# Patient Record
Sex: FEMALE | Race: WHITE | Employment: UNEMPLOYED | URBAN - NONMETROPOLITAN AREA
[De-identification: names, ages, dates, MRNs, and addresses within clinical notes are randomized per-mention and may not be internally consistent; named-entity substitution may affect disease eponyms.]

---

## 2018-05-08 ENCOUNTER — HOSPITAL ENCOUNTER (EMERGENCY)
Age: 28
Discharge: HOME | End: 2018-05-08

## 2018-05-08 VITALS
WEIGHT: 130 LBS | HEART RATE: 83 BPM | HEIGHT: 67 IN | DIASTOLIC BLOOD PRESSURE: 81 MMHG | BODY MASS INDEX: 20.4 KG/M2 | OXYGEN SATURATION: 97 % | RESPIRATION RATE: 13 BRPM | TEMPERATURE: 97.9 F | SYSTOLIC BLOOD PRESSURE: 114 MMHG

## 2018-05-08 DIAGNOSIS — K02.9 PAIN DUE TO DENTAL CARIES: Primary | ICD-10-CM

## 2018-05-08 LAB
AMPHETAMINE+METHAMPHETAMINE URINE SCREEN: POSITIVE
BACTERIA: ABNORMAL /HPF
BARBITURATE QUANTITATIVE URINE: NEGATIVE
BENZODIAZEPINE QUANTITATIVE URINE: NEGATIVE
BILIRUBIN URINE: ABNORMAL
BLOOD, URINE: ABNORMAL
CANNABINOID QUANTITATIVE URINE: POSITIVE
CASTS 2: ABNORMAL /LPF
CASTS UA: ABNORMAL /LPF
CHARACTER, URINE: ABNORMAL
COCAINE METABOLITE QUANTITATIVE URINE: NEGATIVE
COLOR: YELLOW
CRYSTALS, UA: ABNORMAL
EPITHELIAL CELLS, UA: ABNORMAL /HPF
GLUCOSE URINE: NEGATIVE MG/DL
ICTOTEST: NEGATIVE
KETONES, URINE: NEGATIVE
LEUKOCYTE ESTERASE, URINE: ABNORMAL
MISCELLANEOUS 2: ABNORMAL
MUCUS: ABNORMAL
NITRITE, URINE: NEGATIVE
OPIATES, URINE: NEGATIVE
OXYCODONE: NEGATIVE
PH UA: 6
PHENCYCLIDINE QUANTITATIVE URINE: NEGATIVE
PROTEIN UA: 30
RBC URINE: ABNORMAL /HPF
RENAL EPITHELIAL, UA: ABNORMAL
SPECIFIC GRAVITY, URINE: 1.03 (ref 1–1.03)
UROBILINOGEN, URINE: 0.2 EU/DL
WBC UA: > 100 /HPF
YEAST: ABNORMAL

## 2018-05-08 PROCEDURE — 87086 URINE CULTURE/COLONY COUNT: CPT

## 2018-05-08 PROCEDURE — 6370000000 HC RX 637 (ALT 250 FOR IP): Performed by: NURSE PRACTITIONER

## 2018-05-08 PROCEDURE — 99283 EMERGENCY DEPT VISIT LOW MDM: CPT

## 2018-05-08 PROCEDURE — 81001 URINALYSIS AUTO W/SCOPE: CPT

## 2018-05-08 PROCEDURE — 80307 DRUG TEST PRSMV CHEM ANLYZR: CPT

## 2018-05-08 RX ORDER — PENICILLIN V POTASSIUM 500 MG/1
500 TABLET ORAL 4 TIMES DAILY
Qty: 28 TABLET | Refills: 0 | Status: SHIPPED | OUTPATIENT
Start: 2018-05-08 | End: 2018-05-15

## 2018-05-08 RX ADMIN — Medication 15 ML: at 02:40

## 2018-05-08 ASSESSMENT — PAIN SCALES - GENERAL
PAINLEVEL_OUTOF10: 10
PAINLEVEL_OUTOF10: 10

## 2018-05-08 ASSESSMENT — PAIN DESCRIPTION - DESCRIPTORS: DESCRIPTORS: SHOOTING;SHARP

## 2018-05-08 ASSESSMENT — PAIN DESCRIPTION - LOCATION
LOCATION: TEETH
LOCATION: TEETH

## 2018-05-08 ASSESSMENT — PAIN DESCRIPTION - ORIENTATION
ORIENTATION: LEFT
ORIENTATION: LEFT

## 2018-05-08 ASSESSMENT — PAIN DESCRIPTION - PAIN TYPE: TYPE: ACUTE PAIN

## 2018-05-09 LAB
ORGANISM: ABNORMAL
URINE CULTURE REFLEX: ABNORMAL

## 2018-05-16 ASSESSMENT — ENCOUNTER SYMPTOMS
COUGH: 0
BACK PAIN: 0
CHEST TIGHTNESS: 0
RHINORRHEA: 0

## 2019-03-05 ENCOUNTER — HOSPITAL ENCOUNTER (EMERGENCY)
Age: 29
Discharge: HOME | End: 2019-03-05

## 2019-03-05 ENCOUNTER — APPOINTMENT (OUTPATIENT)
Dept: CT IMAGING | Age: 29
End: 2019-03-05

## 2019-03-05 VITALS
WEIGHT: 120 LBS | TEMPERATURE: 97.9 F | OXYGEN SATURATION: 100 % | BODY MASS INDEX: 18.83 KG/M2 | SYSTOLIC BLOOD PRESSURE: 128 MMHG | HEART RATE: 55 BPM | HEIGHT: 67 IN | RESPIRATION RATE: 12 BRPM | DIASTOLIC BLOOD PRESSURE: 82 MMHG

## 2019-03-05 DIAGNOSIS — K02.9 DENTAL DECAY: ICD-10-CM

## 2019-03-05 DIAGNOSIS — F41.0 ANXIETY ATTACK: Primary | ICD-10-CM

## 2019-03-05 LAB
ALBUMIN SERPL-MCNC: 4.3 G/DL (ref 3.5–5.1)
ALP BLD-CCNC: 43 U/L (ref 38–126)
ALT SERPL-CCNC: 10 U/L (ref 11–66)
AMPHETAMINE+METHAMPHETAMINE URINE SCREEN: POSITIVE
ANION GAP SERPL CALCULATED.3IONS-SCNC: 15 MEQ/L (ref 8–16)
AST SERPL-CCNC: 16 U/L (ref 5–40)
BACTERIA: ABNORMAL /HPF
BARBITURATE QUANTITATIVE URINE: NEGATIVE
BASOPHILS # BLD: 0.7 %
BASOPHILS ABSOLUTE: 0.1 THOU/MM3 (ref 0–0.1)
BENZODIAZEPINE QUANTITATIVE URINE: NEGATIVE
BILIRUB SERPL-MCNC: 0.3 MG/DL (ref 0.3–1.2)
BILIRUBIN URINE: NEGATIVE
BLOOD, URINE: NEGATIVE
BUN BLDV-MCNC: 10 MG/DL (ref 7–22)
CALCIUM SERPL-MCNC: 9.2 MG/DL (ref 8.5–10.5)
CANNABINOID QUANTITATIVE URINE: POSITIVE
CASTS 2: ABNORMAL /LPF
CASTS UA: ABNORMAL /LPF
CHARACTER, URINE: CLEAR
CHLORIDE BLD-SCNC: 106 MEQ/L (ref 98–111)
CO2: 21 MEQ/L (ref 23–33)
COCAINE METABOLITE QUANTITATIVE URINE: NEGATIVE
COLOR: YELLOW
CREAT SERPL-MCNC: 0.7 MG/DL (ref 0.4–1.2)
CRYSTALS, UA: ABNORMAL
EKG ATRIAL RATE: 95 BPM
EKG P AXIS: 76 DEGREES
EKG P-R INTERVAL: 110 MS
EKG Q-T INTERVAL: 390 MS
EKG QRS DURATION: 86 MS
EKG QTC CALCULATION (BAZETT): 490 MS
EKG R AXIS: 76 DEGREES
EKG T AXIS: 57 DEGREES
EKG VENTRICULAR RATE: 95 BPM
EOSINOPHIL # BLD: 1.8 %
EOSINOPHILS ABSOLUTE: 0.1 THOU/MM3 (ref 0–0.4)
EPITHELIAL CELLS, UA: ABNORMAL /HPF
ERYTHROCYTE [DISTWIDTH] IN BLOOD BY AUTOMATED COUNT: 12.1 % (ref 11.5–14.5)
ERYTHROCYTE [DISTWIDTH] IN BLOOD BY AUTOMATED COUNT: 40.6 FL (ref 35–45)
FLU A ANTIGEN: NEGATIVE
FLU B ANTIGEN: NEGATIVE
GFR SERPL CREATININE-BSD FRML MDRD: > 90 ML/MIN/1.73M2
GLUCOSE BLD-MCNC: 93 MG/DL (ref 70–108)
GLUCOSE URINE: NEGATIVE MG/DL
HCT VFR BLD CALC: 36.2 % (ref 37–47)
HEMOGLOBIN: 12.2 GM/DL (ref 12–16)
IMMATURE GRANS (ABS): 0.01 THOU/MM3 (ref 0–0.07)
IMMATURE GRANULOCYTES: 0.1 %
KETONES, URINE: 15
LEUKOCYTE ESTERASE, URINE: ABNORMAL
LIPASE: 15.3 U/L (ref 5.6–51.3)
LYMPHOCYTES # BLD: 33.8 %
LYMPHOCYTES ABSOLUTE: 2.5 THOU/MM3 (ref 1–4.8)
MCH RBC QN AUTO: 30.9 PG (ref 26–33)
MCHC RBC AUTO-ENTMCNC: 33.7 GM/DL (ref 32.2–35.5)
MCV RBC AUTO: 91.6 FL (ref 81–99)
MISCELLANEOUS 2: ABNORMAL
MONOCYTES # BLD: 7.3 %
MONOCYTES ABSOLUTE: 0.5 THOU/MM3 (ref 0.4–1.3)
NITRITE, URINE: NEGATIVE
NUCLEATED RED BLOOD CELLS: 0 /100 WBC
OPIATES, URINE: NEGATIVE
OSMOLALITY CALCULATION: 281.9 MOSMOL/KG (ref 275–300)
OXYCODONE: NEGATIVE
PH UA: 7.5 (ref 5–9)
PHENCYCLIDINE QUANTITATIVE URINE: NEGATIVE
PLATELET # BLD: 293 THOU/MM3 (ref 130–400)
PMV BLD AUTO: 9.8 FL (ref 9.4–12.4)
POTASSIUM SERPL-SCNC: 3.6 MEQ/L (ref 3.5–5.2)
PREGNANCY, SERUM: NEGATIVE
PROTEIN UA: NEGATIVE
RBC # BLD: 3.95 MILL/MM3 (ref 4.2–5.4)
RBC URINE: ABNORMAL /HPF
RENAL EPITHELIAL, UA: ABNORMAL
SEG NEUTROPHILS: 56.3 %
SEGMENTED NEUTROPHILS ABSOLUTE COUNT: 4.1 THOU/MM3 (ref 1.8–7.7)
SODIUM BLD-SCNC: 142 MEQ/L (ref 135–145)
SPECIFIC GRAVITY, URINE: 1.01 (ref 1–1.03)
TOTAL PROTEIN: 6.9 G/DL (ref 6.1–8)
UROBILINOGEN, URINE: 0.2 EU/DL (ref 0–1)
WBC # BLD: 7.3 THOU/MM3 (ref 4.8–10.8)
WBC UA: ABNORMAL /HPF
YEAST: ABNORMAL

## 2019-03-05 PROCEDURE — 6360000004 HC RX CONTRAST MEDICATION: Performed by: STUDENT IN AN ORGANIZED HEALTH CARE EDUCATION/TRAINING PROGRAM

## 2019-03-05 PROCEDURE — 85025 COMPLETE CBC W/AUTO DIFF WBC: CPT

## 2019-03-05 PROCEDURE — 2709999900 HC NON-CHARGEABLE SUPPLY

## 2019-03-05 PROCEDURE — 87106 FUNGI IDENTIFICATION YEAST: CPT

## 2019-03-05 PROCEDURE — 70491 CT SOFT TISSUE NECK W/DYE: CPT

## 2019-03-05 PROCEDURE — 93010 ELECTROCARDIOGRAM REPORT: CPT | Performed by: INTERNAL MEDICINE

## 2019-03-05 PROCEDURE — 83690 ASSAY OF LIPASE: CPT

## 2019-03-05 PROCEDURE — 36415 COLL VENOUS BLD VENIPUNCTURE: CPT

## 2019-03-05 PROCEDURE — 93005 ELECTROCARDIOGRAM TRACING: CPT | Performed by: STUDENT IN AN ORGANIZED HEALTH CARE EDUCATION/TRAINING PROGRAM

## 2019-03-05 PROCEDURE — 87804 INFLUENZA ASSAY W/OPTIC: CPT

## 2019-03-05 PROCEDURE — 6370000000 HC RX 637 (ALT 250 FOR IP): Performed by: STUDENT IN AN ORGANIZED HEALTH CARE EDUCATION/TRAINING PROGRAM

## 2019-03-05 PROCEDURE — 99285 EMERGENCY DEPT VISIT HI MDM: CPT

## 2019-03-05 PROCEDURE — 80307 DRUG TEST PRSMV CHEM ANLYZR: CPT

## 2019-03-05 PROCEDURE — 80053 COMPREHEN METABOLIC PANEL: CPT

## 2019-03-05 PROCEDURE — 84703 CHORIONIC GONADOTROPIN ASSAY: CPT

## 2019-03-05 PROCEDURE — 87086 URINE CULTURE/COLONY COUNT: CPT

## 2019-03-05 PROCEDURE — 81001 URINALYSIS AUTO W/SCOPE: CPT

## 2019-03-05 RX ORDER — PENICILLIN V POTASSIUM 500 MG/1
500 TABLET ORAL 4 TIMES DAILY
Qty: 28 TABLET | Refills: 0 | Status: SHIPPED | OUTPATIENT
Start: 2019-03-05 | End: 2019-03-12

## 2019-03-05 RX ORDER — ALPRAZOLAM 0.5 MG/1
0.5 TABLET ORAL ONCE
Status: COMPLETED | OUTPATIENT
Start: 2019-03-05 | End: 2019-03-05

## 2019-03-05 RX ADMIN — LIDOCAINE HYDROCHLORIDE 5 ML: 20 SOLUTION ORAL; TOPICAL at 10:12

## 2019-03-05 RX ADMIN — ALPRAZOLAM 0.5 MG: 0.5 TABLET ORAL at 10:11

## 2019-03-05 RX ADMIN — IOPAMIDOL 80 ML: 755 INJECTION, SOLUTION INTRAVENOUS at 12:43

## 2019-03-05 ASSESSMENT — ENCOUNTER SYMPTOMS
EYE PAIN: 0
BACK PAIN: 0
BLOOD IN STOOL: 0
CONSTIPATION: 0
SHORTNESS OF BREATH: 1
VOMITING: 0
CHEST TIGHTNESS: 0
NAUSEA: 1
COUGH: 0
PHOTOPHOBIA: 0
WHEEZING: 0
DIARRHEA: 0
SORE THROAT: 0
RHINORRHEA: 0
ABDOMINAL PAIN: 1

## 2019-03-10 LAB
ORGANISM: ABNORMAL
URINE CULTURE REFLEX: ABNORMAL
URINE CULTURE REFLEX: ABNORMAL

## 2019-09-13 ENCOUNTER — HOSPITAL ENCOUNTER (OUTPATIENT)
Age: 29
Setting detail: SPECIMEN
Discharge: HOME | End: 2019-09-13

## 2019-09-15 LAB
CULTURE: ABNORMAL
Lab: ABNORMAL
SPECIMEN DESCRIPTION: ABNORMAL

## 2020-01-25 ENCOUNTER — HOSPITAL ENCOUNTER (EMERGENCY)
Age: 30
Discharge: HOME | End: 2020-01-25
Attending: FAMILY MEDICINE

## 2020-01-25 VITALS
TEMPERATURE: 97.6 F | SYSTOLIC BLOOD PRESSURE: 131 MMHG | OXYGEN SATURATION: 100 % | RESPIRATION RATE: 16 BRPM | DIASTOLIC BLOOD PRESSURE: 93 MMHG | HEART RATE: 97 BPM

## 2020-01-25 LAB
AMPHETAMINE+METHAMPHETAMINE URINE SCREEN: NORMAL
BARBITURATE QUANTITATIVE URINE: NEGATIVE
BENZODIAZEPINE QUANTITATIVE URINE: NEGATIVE
CANNABINOID QUANTITATIVE URINE: NORMAL
COCAINE METABOLITE QUANTITATIVE URINE: NEGATIVE
OPIATES, URINE: NEGATIVE
OXYCODONE: NEGATIVE
PHENCYCLIDINE QUANTITATIVE URINE: NEGATIVE
PREGNANCY, SERUM: NEGATIVE

## 2020-01-25 PROCEDURE — 6370000000 HC RX 637 (ALT 250 FOR IP): Performed by: FAMILY MEDICINE

## 2020-01-25 PROCEDURE — 36415 COLL VENOUS BLD VENIPUNCTURE: CPT

## 2020-01-25 PROCEDURE — 99283 EMERGENCY DEPT VISIT LOW MDM: CPT

## 2020-01-25 PROCEDURE — 84703 CHORIONIC GONADOTROPIN ASSAY: CPT

## 2020-01-25 PROCEDURE — 80305 DRUG TEST PRSMV DIR OPT OBS: CPT

## 2020-01-25 RX ORDER — AMOXICILLIN AND CLAVULANATE POTASSIUM 250; 125 MG/1; MG/1
1 TABLET, FILM COATED ORAL ONCE
Status: COMPLETED | OUTPATIENT
Start: 2020-01-25 | End: 2020-01-25

## 2020-01-25 RX ORDER — ACETAMINOPHEN 500 MG
1000 TABLET ORAL ONCE
Status: COMPLETED | OUTPATIENT
Start: 2020-01-25 | End: 2020-01-25

## 2020-01-25 RX ORDER — AMOXICILLIN 250 MG/1
250 CAPSULE ORAL ONCE
Status: COMPLETED | OUTPATIENT
Start: 2020-01-25 | End: 2020-01-25

## 2020-01-25 RX ORDER — AMOXICILLIN AND CLAVULANATE POTASSIUM 500; 125 MG/1; MG/1
1 TABLET, FILM COATED ORAL 3 TIMES DAILY
Qty: 30 TABLET | Refills: 0 | Status: SHIPPED | OUTPATIENT
Start: 2020-01-25 | End: 2020-02-04

## 2020-01-25 RX ADMIN — AMOXICILLIN AND CLAVULANATE POTASSIUM 1 TABLET: 250; 125 TABLET, FILM COATED ORAL at 07:49

## 2020-01-25 RX ADMIN — ACETAMINOPHEN 1000 MG: 500 TABLET ORAL at 07:50

## 2020-01-25 RX ADMIN — AMOXICILLIN 250 MG: 250 CAPSULE ORAL at 07:49

## 2020-01-25 ASSESSMENT — ENCOUNTER SYMPTOMS
NAUSEA: 0
ABDOMINAL PAIN: 0
SHORTNESS OF BREATH: 0
EYE DISCHARGE: 0
VOMITING: 0

## 2020-01-25 ASSESSMENT — PAIN DESCRIPTION - ORIENTATION: ORIENTATION: LEFT;UPPER

## 2020-01-25 ASSESSMENT — PAIN DESCRIPTION - PAIN TYPE: TYPE: ACUTE PAIN

## 2020-01-25 ASSESSMENT — PAIN SCALES - GENERAL
PAINLEVEL_OUTOF10: 10
PAINLEVEL_OUTOF10: 10

## 2020-01-25 ASSESSMENT — PAIN DESCRIPTION - LOCATION: LOCATION: TEETH

## 2020-01-25 NOTE — ED PROVIDER NOTES
[facility]  eMERGENCY dEPARTMENT eNCOUnter          CHIEF COMPLAINT       Chief Complaint   Patient presents with    Dental Pain       Nurses Notes reviewed and I agree except as noted in the HPI. HISTORY OF PRESENT ILLNESS    Charlyn Brigido Gottron is a 30 y.o. female who presents to the Emergency Department for the evaluation of dental pain. The patient states she had some fillings fall out about a year ago and did not get them repaired. She states in the last week she has noticed some yellow drainage, chest pain, left ear pain and difficulty swallowing worsening. She states she has tried mouthwash and brushing her teeth without any relief. The patient denies taking any daily medication or drug use. She is a daily half pack per day smoker and states she does not have a PCP. She rates her current pain a 10/10. The patient denies any other symptoms or relevant history at this time. Location/Symptom: dental pain, left ear pain  Timing/Onset: one week   Context/Setting: history of unrepaired dental fillings   Duration: constant   Modifying Factors: no relief with mouthwash or brushing   Severity: 10/10     The HPI was provided by the patient. REVIEW OF SYSTEMS     Review of Systems   Constitutional: Negative for chills and fever. HENT: Positive for dental problem and ear pain. Left maxillary molar fillings were lost a year ago    Started having more dental pain in the last week with drainage    Pain left maxillary teeth radiating up towards the left ear   Eyes: Negative for discharge. Respiratory: Negative for shortness of breath. Cardiovascular: Negative for chest pain. Gastrointestinal: Negative for abdominal pain, nausea and vomiting. Genitourinary:        LMP 2 weeks ago   Musculoskeletal: Negative for joint swelling. Skin: Negative for rash. Neurological: Negative for headaches. Hematological: Negative for adenopathy.    Psychiatric/Behavioral:        Denies drug use       PAST MEDICAL HISTORY    has a past medical history of Anxiety and History of migraine headaches. SURGICAL HISTORY    has no past surgical history on file. CURRENT MEDICATIONS       Previous Medications    No medications on file       ALLERGIES     has No Known Allergies. FAMILY HISTORY     She indicated that the status of her mother is unknown. She indicated that the status of her father is unknown. She indicated that her sister is alive. family history includes Depression in her sister; No Known Problems in her father and mother. SOCIAL HISTORY      reports that she has been smoking cigarettes. She has a 5.00 pack-year smoking history. She has never used smokeless tobacco. She reports that she does not drink alcohol or use drugs. PHYSICAL EXAM     INITIAL VITALS:  oral temperature is 97.6 °F (36.4 °C). Her blood pressure is 131/93 (abnormal) and her pulse is 97. Her respiration is 16 and oxygen saturation is 100%. Physical Exam  Vitals signs and nursing note reviewed. Constitutional:       Comments: GCS 15   HENT:      Head: Normocephalic and atraumatic. Comments: Ear canals clear TMs are normal    Nose is clear    Mouth showed left maxillary molars with large cavities, the mucous membranes appear normal  No discharge is seen    No soft tissue swelling noted  Eyes:      General:         Right eye: No discharge. Left eye: No discharge. Extraocular Movements: Extraocular movements intact. Pupils: Pupils are equal, round, and reactive to light. Neck:      Musculoskeletal: Normal range of motion. Comments: Neck is supple with no adenopathy    No soft tissue swelling  Cardiovascular:      Rate and Rhythm: Normal rate and regular rhythm. Pulmonary:      Effort: Pulmonary effort is normal.      Breath sounds: Normal breath sounds. Musculoskeletal:         General: No swelling. Lymphadenopathy:      Cervical: No cervical adenopathy.    Skin: General: Skin is warm and dry. Neurological:      General: No focal deficit present. Mental Status: She is alert. Psychiatric:         Mood and Affect: Mood normal.         Behavior: Behavior normal.         DIFFERENTIAL DIAGNOSIS:     Left-sided dental pain    Chronic dental caries    We will start some Augmentin give Tylenol for pain    Check pregnancy test    Review of records indicates multiple drug screen is positive for amphetamines plus marijuana in the past    Patient denies drug use but will need to assess her urine drug screen    DIAGNOSTIC RESULTS       RADIOLOGY: non-plain film images(s) such as CT, Ultrasound and MRI are read by the radiologist.    No orders to display        LABS:   Labs Reviewed   HCG, SERUM, QUALITATIVE   RAPID DRUG SCREEN, URINE       EMERGENCY DEPARTMENT COURSE:   Vitals:    Vitals:    01/25/20 0742 01/25/20 0743   BP: (!) 131/93    Pulse: 97    Resp: 16    Temp:  97.6 °F (36.4 °C)   TempSrc:  Oral   SpO2: 100%        7:44 AM: The patient was seen and evaluated. Nursing notes reviewed    Augmentin 500 mg p.o. Tylenol 1000 mg po    Pregnancy test negative    Urine drug screen was positive for amphetamines plus marijuana    Does feel comfortable going home        CRITICAL CARE:   none     CONSULTS:  none    PROCEDURES:  none     FINAL IMPRESSION      1.  Pain due to dental caries          DISPOSITION/PLAN     Discharge home    Augmentin 500 mg 3 times daily for infection    Ibuprofen/Tylenol for pain    Given list of dental resources for outpatient follow-up    PATIENT REFERRED TO:  Follow-up with dentist of your choice            DISCHARGE MEDICATIONS:  New Prescriptions    AMOXICILLIN-CLAVULANATE (AUGMENTIN) 500-125 MG PER TABLET    Take 1 tablet by mouth 3 times daily for 10 days       (Please note that portions of this note were completed with a voice recognition program.  Efforts were made to edit the dictations but occasionally words are mis-transcribed.)    Scribe:  Vinay Noel 1/25/20 7:44 AM Scribing for and in the presence of Noreen Grier MD.    Signed by: Manan Varghese, 01/25/20 8:47 AM    Provider:  I personally performed the services described in the documentation, reviewed and edited the documentation which was dictated to the scribe in my presence, and it accurately records my words and actions.     Noreen Grier MD 1/25/20 8:47 AM       Noreen Grier MD  01/25/20 [id number]

## 2021-02-01 ENCOUNTER — HOSPITAL ENCOUNTER (EMERGENCY)
Age: 31
Discharge: HOME | End: 2021-02-01
Attending: EMERGENCY MEDICINE
Payer: COMMERCIAL

## 2021-02-01 VITALS
HEIGHT: 66 IN | WEIGHT: 130 LBS | HEART RATE: 98 BPM | RESPIRATION RATE: 21 BRPM | SYSTOLIC BLOOD PRESSURE: 116 MMHG | DIASTOLIC BLOOD PRESSURE: 78 MMHG | OXYGEN SATURATION: 97 % | TEMPERATURE: 98 F | BODY MASS INDEX: 20.89 KG/M2

## 2021-02-01 DIAGNOSIS — F15.10 METHAMPHETAMINE ABUSE (HCC): Primary | ICD-10-CM

## 2021-02-01 DIAGNOSIS — K08.89 PAIN, DENTAL: ICD-10-CM

## 2021-02-01 DIAGNOSIS — N39.0 URINARY TRACT INFECTION WITHOUT HEMATURIA, SITE UNSPECIFIED: ICD-10-CM

## 2021-02-01 LAB
ALBUMIN SERPL-MCNC: 4.7 G/DL (ref 3.5–5.1)
ALP BLD-CCNC: 54 U/L (ref 38–126)
ALT SERPL-CCNC: 16 U/L (ref 11–66)
AMPHETAMINE+METHAMPHETAMINE URINE SCREEN: POSITIVE
ANION GAP SERPL CALCULATED.3IONS-SCNC: 13 MEQ/L (ref 8–16)
AST SERPL-CCNC: 19 U/L (ref 5–40)
BACTERIA: ABNORMAL /HPF
BARBITURATE QUANTITATIVE URINE: NEGATIVE
BASOPHILS # BLD: 0.4 %
BASOPHILS ABSOLUTE: 0 THOU/MM3 (ref 0–0.1)
BENZODIAZEPINE QUANTITATIVE URINE: NEGATIVE
BILIRUB SERPL-MCNC: 0.4 MG/DL (ref 0.3–1.2)
BILIRUBIN URINE: NEGATIVE
BLOOD, URINE: ABNORMAL
BUN BLDV-MCNC: 8 MG/DL (ref 7–22)
CALCIUM SERPL-MCNC: 9.6 MG/DL (ref 8.5–10.5)
CANNABINOID QUANTITATIVE URINE: POSITIVE
CASTS 2: ABNORMAL /LPF
CASTS UA: ABNORMAL /LPF
CHARACTER, URINE: ABNORMAL
CHLORIDE BLD-SCNC: 102 MEQ/L (ref 98–111)
CO2: 24 MEQ/L (ref 23–33)
COCAINE METABOLITE QUANTITATIVE URINE: NEGATIVE
COLOR: YELLOW
CREAT SERPL-MCNC: 0.7 MG/DL (ref 0.4–1.2)
CRYSTALS, UA: ABNORMAL
EOSINOPHIL # BLD: 0.1 %
EOSINOPHILS ABSOLUTE: 0 THOU/MM3 (ref 0–0.4)
EPITHELIAL CELLS, UA: ABNORMAL /HPF
ERYTHROCYTE [DISTWIDTH] IN BLOOD BY AUTOMATED COUNT: 12.1 % (ref 11.5–14.5)
ERYTHROCYTE [DISTWIDTH] IN BLOOD BY AUTOMATED COUNT: 42 FL (ref 35–45)
GFR SERPL CREATININE-BSD FRML MDRD: > 90 ML/MIN/1.73M2
GLUCOSE BLD-MCNC: 99 MG/DL (ref 70–108)
GLUCOSE URINE: NEGATIVE MG/DL
HCT VFR BLD CALC: 44.3 % (ref 37–47)
HEMOGLOBIN: 14.7 GM/DL (ref 12–16)
IMMATURE GRANS (ABS): 0.02 THOU/MM3 (ref 0–0.07)
IMMATURE GRANULOCYTES: 0.2 %
KETONES, URINE: ABNORMAL
LEUKOCYTE ESTERASE, URINE: ABNORMAL
LIPASE: 17.5 U/L (ref 5.6–51.3)
LYMPHOCYTES # BLD: 9.1 %
LYMPHOCYTES ABSOLUTE: 0.9 THOU/MM3 (ref 1–4.8)
MAGNESIUM: 2 MG/DL (ref 1.6–2.4)
MCH RBC QN AUTO: 31.5 PG (ref 26–33)
MCHC RBC AUTO-ENTMCNC: 33.2 GM/DL (ref 32.2–35.5)
MCV RBC AUTO: 94.9 FL (ref 81–99)
MISCELLANEOUS 2: ABNORMAL
MONOCYTES # BLD: 1.6 %
MONOCYTES ABSOLUTE: 0.2 THOU/MM3 (ref 0.4–1.3)
MUCUS: ABNORMAL
NITRITE, URINE: NEGATIVE
NUCLEATED RED BLOOD CELLS: 0 /100 WBC
OPIATES, URINE: NEGATIVE
OSMOLALITY CALCULATION: 275.9 MOSMOL/KG (ref 275–300)
OXYCODONE: NEGATIVE
PH UA: 6 (ref 5–9)
PHENCYCLIDINE QUANTITATIVE URINE: NEGATIVE
PLATELET # BLD: 263 THOU/MM3 (ref 130–400)
PMV BLD AUTO: 9.6 FL (ref 9.4–12.4)
POTASSIUM SERPL-SCNC: 4.2 MEQ/L (ref 3.5–5.2)
PREGNANCY, SERUM: NEGATIVE
PROTEIN UA: ABNORMAL
RBC # BLD: 4.67 MILL/MM3 (ref 4.2–5.4)
RBC URINE: ABNORMAL /HPF
RENAL EPITHELIAL, UA: ABNORMAL
SEG NEUTROPHILS: 88.6 %
SEGMENTED NEUTROPHILS ABSOLUTE COUNT: 8.5 THOU/MM3 (ref 1.8–7.7)
SODIUM BLD-SCNC: 139 MEQ/L (ref 135–145)
SPECIFIC GRAVITY, URINE: 1.02 (ref 1–1.03)
TOTAL PROTEIN: 7.9 G/DL (ref 6.1–8)
UROBILINOGEN, URINE: 0.2 EU/DL (ref 0–1)
WBC # BLD: 9.6 THOU/MM3 (ref 4.8–10.8)
WBC UA: > 200 /HPF
YEAST: ABNORMAL

## 2021-02-01 PROCEDURE — 87086 URINE CULTURE/COLONY COUNT: CPT

## 2021-02-01 PROCEDURE — 80053 COMPREHEN METABOLIC PANEL: CPT

## 2021-02-01 PROCEDURE — 80307 DRUG TEST PRSMV CHEM ANLYZR: CPT

## 2021-02-01 PROCEDURE — 6370000000 HC RX 637 (ALT 250 FOR IP): Performed by: EMERGENCY MEDICINE

## 2021-02-01 PROCEDURE — 83690 ASSAY OF LIPASE: CPT

## 2021-02-01 PROCEDURE — 6370000000 HC RX 637 (ALT 250 FOR IP)

## 2021-02-01 PROCEDURE — 36415 COLL VENOUS BLD VENIPUNCTURE: CPT

## 2021-02-01 PROCEDURE — 85025 COMPLETE CBC W/AUTO DIFF WBC: CPT

## 2021-02-01 PROCEDURE — 99284 EMERGENCY DEPT VISIT MOD MDM: CPT

## 2021-02-01 PROCEDURE — 84703 CHORIONIC GONADOTROPIN ASSAY: CPT

## 2021-02-01 PROCEDURE — 83735 ASSAY OF MAGNESIUM: CPT

## 2021-02-01 PROCEDURE — 81001 URINALYSIS AUTO W/SCOPE: CPT

## 2021-02-01 RX ORDER — ONDANSETRON 4 MG/1
4 TABLET, ORALLY DISINTEGRATING ORAL ONCE
Status: COMPLETED | OUTPATIENT
Start: 2021-02-01 | End: 2021-02-01

## 2021-02-01 RX ORDER — HYDROXYZINE PAMOATE 25 MG/1
25 CAPSULE ORAL 3 TIMES DAILY PRN
Qty: 30 CAPSULE | Refills: 0 | Status: SHIPPED | OUTPATIENT
Start: 2021-02-01 | End: 2021-02-11

## 2021-02-01 RX ORDER — ONDANSETRON 4 MG/1
4 TABLET, ORALLY DISINTEGRATING ORAL ONCE
Status: DISCONTINUED | OUTPATIENT
Start: 2021-02-01 | End: 2021-02-01

## 2021-02-01 RX ORDER — LORAZEPAM 1 MG/1
1 TABLET ORAL ONCE
Status: DISCONTINUED | OUTPATIENT
Start: 2021-02-01 | End: 2021-02-01

## 2021-02-01 RX ORDER — AMOXICILLIN AND CLAVULANATE POTASSIUM 875; 125 MG/1; MG/1
1 TABLET, FILM COATED ORAL 2 TIMES DAILY
Qty: 20 TABLET | Refills: 0 | Status: SHIPPED | OUTPATIENT
Start: 2021-02-01 | End: 2021-02-11

## 2021-02-01 RX ORDER — IBUPROFEN 600 MG/1
600 TABLET ORAL EVERY 6 HOURS PRN
Qty: 40 TABLET | Refills: 0 | Status: SHIPPED | OUTPATIENT
Start: 2021-02-01 | End: 2021-02-11

## 2021-02-01 RX ORDER — IBUPROFEN 200 MG
TABLET ORAL
Status: COMPLETED
Start: 2021-02-01 | End: 2021-02-01

## 2021-02-01 RX ORDER — LORAZEPAM 1 MG/1
1 TABLET ORAL ONCE
Status: COMPLETED | OUTPATIENT
Start: 2021-02-01 | End: 2021-02-01

## 2021-02-01 RX ORDER — CLINDAMYCIN HYDROCHLORIDE 150 MG/1
300 CAPSULE ORAL ONCE
Status: COMPLETED | OUTPATIENT
Start: 2021-02-01 | End: 2021-02-01

## 2021-02-01 RX ORDER — IBUPROFEN 200 MG
600 TABLET ORAL ONCE
Status: COMPLETED | OUTPATIENT
Start: 2021-02-01 | End: 2021-02-01

## 2021-02-01 RX ADMIN — CLINDAMYCIN HYDROCHLORIDE 300 MG: 150 CAPSULE ORAL at 04:11

## 2021-02-01 RX ADMIN — LORAZEPAM 1 MG: 1 TABLET ORAL at 03:35

## 2021-02-01 RX ADMIN — Medication 600 MG: at 04:12

## 2021-02-01 RX ADMIN — ONDANSETRON 4 MG: 4 TABLET, ORALLY DISINTEGRATING ORAL at 03:35

## 2021-02-01 RX ADMIN — IBUPROFEN 600 MG: 200 TABLET, FILM COATED ORAL at 04:12

## 2021-02-01 ASSESSMENT — ENCOUNTER SYMPTOMS
EYE ITCHING: 0
ABDOMINAL DISTENTION: 0
SHORTNESS OF BREATH: 0
CHOKING: 0
ABDOMINAL PAIN: 0
WHEEZING: 0
TROUBLE SWALLOWING: 0
NAUSEA: 1
VOICE CHANGE: 0
RHINORRHEA: 0
EYE PAIN: 0
VOMITING: 1
DIARRHEA: 0
COUGH: 0
CHEST TIGHTNESS: 0
CONSTIPATION: 0
SORE THROAT: 0
EYE REDNESS: 0
EYE DISCHARGE: 0
BACK PAIN: 0
SINUS PRESSURE: 0
BLOOD IN STOOL: 0
PHOTOPHOBIA: 0

## 2021-02-01 ASSESSMENT — PAIN SCALES - GENERAL: PAINLEVEL_OUTOF10: 10

## 2021-02-01 NOTE — ED NOTES
Pt. Resting in bed with even and unlabored respirations. Pt. States pain is a 10/10adn medicated per mar at this time. Pt also medicated for dental pain. Pt states anxiety is better. Pt. Updated about plan of care and treatment. Pt. Has no further concerns, questions or needs at this time. Call light within reach.         Talya Grey RN  02/01/21 [id number]

## 2021-02-01 NOTE — ED NOTES
Discharge instructions and new medications discussed and explained with Pt. Pt. Verbalized understanding and has no further questions or needs at this time.         Mirtha Crawford RN  02/01/21 [id number]

## 2021-02-01 NOTE — ED NOTES
Pt arrives to the ED for anxiety after using meth 2 days ago. Pt states she has been in recovery from meth for less then 6 months. Pt states she relapsed 2 days ago. Pt states she had an \"episode\" of her heart fluttering and feeling as though she couldn't breath after doing meth and has not feel well since then. Pt states she has weird feelings in her neck, upper abdomen and left arm. Pt states she is not nauseated. Pt states she does not necessarily feel pain at this time. Pt denies any chest pain, fever, sob or cough. Pt appears to be very anxious. Pt respirations are even and unlabored. Pt vitals are stable.       Mayte Gan RN  02/01/21 2067

## 2021-02-02 LAB
ORGANISM: ABNORMAL
URINE CULTURE REFLEX: ABNORMAL

## 2021-02-02 ASSESSMENT — ENCOUNTER SYMPTOMS
SHORTNESS OF BREATH: 0
COUGH: 0
NAUSEA: 1
CHEST TIGHTNESS: 0
EYE PAIN: 0
CONSTIPATION: 0
ABDOMINAL DISTENTION: 0
BLOOD IN STOOL: 0
EYE REDNESS: 0
ABDOMINAL PAIN: 0
VOMITING: 1
TROUBLE SWALLOWING: 0
PHOTOPHOBIA: 0
RHINORRHEA: 0
DIARRHEA: 0
BACK PAIN: 0
WHEEZING: 0
EYE DISCHARGE: 0
SORE THROAT: 0
VOICE CHANGE: 0
SINUS PRESSURE: 0
EYE ITCHING: 0
CHOKING: 0

## 2021-02-17 ENCOUNTER — APPOINTMENT (OUTPATIENT)
Dept: GENERAL RADIOLOGY | Age: 31
End: 2021-02-17
Payer: COMMERCIAL

## 2021-02-17 ENCOUNTER — HOSPITAL ENCOUNTER (EMERGENCY)
Age: 31
Discharge: HOME | End: 2021-02-18
Attending: EMERGENCY MEDICINE
Payer: COMMERCIAL

## 2021-02-17 DIAGNOSIS — R94.31 PROLONGED Q-T INTERVAL ON ECG: ICD-10-CM

## 2021-02-17 DIAGNOSIS — F14.10 COCAINE ABUSE (HCC): Primary | ICD-10-CM

## 2021-02-17 DIAGNOSIS — R82.81 PYURIA: ICD-10-CM

## 2021-02-17 DIAGNOSIS — R07.9 CHEST PAIN, UNSPECIFIED TYPE: ICD-10-CM

## 2021-02-17 LAB
AMPHETAMINE+METHAMPHETAMINE URINE SCREEN: NEGATIVE
ANION GAP SERPL CALCULATED.3IONS-SCNC: 8 MEQ/L (ref 8–16)
BACTERIA: ABNORMAL /HPF
BARBITURATE QUANTITATIVE URINE: NEGATIVE
BASOPHILS # BLD: 1.2 %
BASOPHILS ABSOLUTE: 0.1 THOU/MM3 (ref 0–0.1)
BENZODIAZEPINE QUANTITATIVE URINE: NEGATIVE
BILIRUBIN URINE: NEGATIVE
BLOOD, URINE: ABNORMAL
BUN BLDV-MCNC: 14 MG/DL (ref 7–22)
CALCIUM SERPL-MCNC: 9.1 MG/DL (ref 8.5–10.5)
CANNABINOID QUANTITATIVE URINE: POSITIVE
CASTS 2: ABNORMAL /LPF
CASTS UA: ABNORMAL /LPF
CHARACTER, URINE: CLEAR
CHLORIDE BLD-SCNC: 104 MEQ/L (ref 98–111)
CO2: 26 MEQ/L (ref 23–33)
COCAINE METABOLITE QUANTITATIVE URINE: POSITIVE
COLOR: YELLOW
CREAT SERPL-MCNC: 0.7 MG/DL (ref 0.4–1.2)
CRYSTALS, UA: ABNORMAL
D-DIMER QUANTITATIVE: 411 NG/ML FEU (ref 0–500)
EOSINOPHIL # BLD: 2.9 %
EOSINOPHILS ABSOLUTE: 0.2 THOU/MM3 (ref 0–0.4)
EPITHELIAL CELLS, UA: ABNORMAL /HPF
ERYTHROCYTE [DISTWIDTH] IN BLOOD BY AUTOMATED COUNT: 12.3 % (ref 11.5–14.5)
ERYTHROCYTE [DISTWIDTH] IN BLOOD BY AUTOMATED COUNT: 42.7 FL (ref 35–45)
FLU A ANTIGEN: NEGATIVE
FLU B ANTIGEN: NEGATIVE
GFR SERPL CREATININE-BSD FRML MDRD: > 90 ML/MIN/1.73M2
GLUCOSE BLD-MCNC: 141 MG/DL (ref 70–108)
GLUCOSE URINE: NEGATIVE MG/DL
HCT VFR BLD CALC: 36.2 % (ref 37–47)
HEMOGLOBIN: 11.7 GM/DL (ref 12–16)
IMMATURE GRANS (ABS): 0.01 THOU/MM3 (ref 0–0.07)
IMMATURE GRANULOCYTES: 0.1 %
KETONES, URINE: NEGATIVE
LEUKOCYTE ESTERASE, URINE: ABNORMAL
LYMPHOCYTES # BLD: 40.2 %
LYMPHOCYTES ABSOLUTE: 3.1 THOU/MM3 (ref 1–4.8)
MAGNESIUM: 2 MG/DL (ref 1.6–2.4)
MCH RBC QN AUTO: 30.6 PG (ref 26–33)
MCHC RBC AUTO-ENTMCNC: 32.3 GM/DL (ref 32.2–35.5)
MCV RBC AUTO: 94.8 FL (ref 81–99)
MISCELLANEOUS 2: ABNORMAL
MONOCYTES # BLD: 9.4 %
MONOCYTES ABSOLUTE: 0.7 THOU/MM3 (ref 0.4–1.3)
NITRITE, URINE: NEGATIVE
NUCLEATED RED BLOOD CELLS: 0 /100 WBC
OPIATES, URINE: NEGATIVE
OSMOLALITY CALCULATION: 278.5 MOSMOL/KG (ref 275–300)
OXYCODONE: NEGATIVE
PH UA: 5 (ref 5–9)
PHENCYCLIDINE QUANTITATIVE URINE: NEGATIVE
PLATELET # BLD: 393 THOU/MM3 (ref 130–400)
PMV BLD AUTO: 9.8 FL (ref 9.4–12.4)
POTASSIUM REFLEX MAGNESIUM: 4.4 MEQ/L (ref 3.5–5.2)
PREGNANCY, URINE: NEGATIVE
PRO-BNP: 38.8 PG/ML (ref 0–450)
PROTEIN UA: NEGATIVE
RBC # BLD: 3.82 MILL/MM3 (ref 4.2–5.4)
RBC URINE: ABNORMAL /HPF
RENAL EPITHELIAL, UA: ABNORMAL
SARS-COV-2, NAAT: NOT DETECTED
SEG NEUTROPHILS: 46.2 %
SEGMENTED NEUTROPHILS ABSOLUTE COUNT: 3.5 THOU/MM3 (ref 1.8–7.7)
SODIUM BLD-SCNC: 138 MEQ/L (ref 135–145)
SPECIFIC GRAVITY, URINE: 1.02 (ref 1–1.03)
TROPONIN T: < 0.01 NG/ML
UROBILINOGEN, URINE: 0.2 EU/DL (ref 0–1)
WBC # BLD: 7.6 THOU/MM3 (ref 4.8–10.8)
WBC UA: ABNORMAL /HPF
YEAST: ABNORMAL

## 2021-02-17 PROCEDURE — 85025 COMPLETE CBC W/AUTO DIFF WBC: CPT

## 2021-02-17 PROCEDURE — 83880 ASSAY OF NATRIURETIC PEPTIDE: CPT

## 2021-02-17 PROCEDURE — 87804 INFLUENZA ASSAY W/OPTIC: CPT

## 2021-02-17 PROCEDURE — 85379 FIBRIN DEGRADATION QUANT: CPT

## 2021-02-17 PROCEDURE — 36415 COLL VENOUS BLD VENIPUNCTURE: CPT

## 2021-02-17 PROCEDURE — 6360000002 HC RX W HCPCS: Performed by: EMERGENCY MEDICINE

## 2021-02-17 PROCEDURE — 80048 BASIC METABOLIC PNL TOTAL CA: CPT

## 2021-02-17 PROCEDURE — 99285 EMERGENCY DEPT VISIT HI MDM: CPT

## 2021-02-17 PROCEDURE — 71045 X-RAY EXAM CHEST 1 VIEW: CPT

## 2021-02-17 PROCEDURE — 96366 THER/PROPH/DIAG IV INF ADDON: CPT

## 2021-02-17 PROCEDURE — 87086 URINE CULTURE/COLONY COUNT: CPT

## 2021-02-17 PROCEDURE — 83735 ASSAY OF MAGNESIUM: CPT

## 2021-02-17 PROCEDURE — 80307 DRUG TEST PRSMV CHEM ANLYZR: CPT

## 2021-02-17 PROCEDURE — 96365 THER/PROPH/DIAG IV INF INIT: CPT

## 2021-02-17 PROCEDURE — 81025 URINE PREGNANCY TEST: CPT

## 2021-02-17 PROCEDURE — 84484 ASSAY OF TROPONIN QUANT: CPT

## 2021-02-17 PROCEDURE — 81001 URINALYSIS AUTO W/SCOPE: CPT

## 2021-02-17 PROCEDURE — 93005 ELECTROCARDIOGRAM TRACING: CPT | Performed by: EMERGENCY MEDICINE

## 2021-02-17 RX ORDER — MAGNESIUM SULFATE IN WATER 40 MG/ML
2000 INJECTION, SOLUTION INTRAVENOUS ONCE
Status: COMPLETED | OUTPATIENT
Start: 2021-02-17 | End: 2021-02-18

## 2021-02-17 RX ADMIN — MAGNESIUM SULFATE HEPTAHYDRATE 2000 MG: 40 INJECTION, SOLUTION INTRAVENOUS at 22:06

## 2021-02-17 ASSESSMENT — ENCOUNTER SYMPTOMS
NAUSEA: 0
BACK PAIN: 0
COUGH: 0
ABDOMINAL PAIN: 0
RHINORRHEA: 0
CHEST TIGHTNESS: 1
VOMITING: 0
WHEEZING: 0
DIARRHEA: 0
SHORTNESS OF BREATH: 0
CONSTIPATION: 0
EYE REDNESS: 0
SORE THROAT: 0

## 2021-02-17 ASSESSMENT — PAIN DESCRIPTION - ONSET: ONSET: ON-GOING

## 2021-02-17 ASSESSMENT — PAIN DESCRIPTION - PAIN TYPE: TYPE: ACUTE PAIN

## 2021-02-17 ASSESSMENT — PAIN SCALES - GENERAL: PAINLEVEL_OUTOF10: 10

## 2021-02-18 VITALS
DIASTOLIC BLOOD PRESSURE: 66 MMHG | WEIGHT: 125 LBS | RESPIRATION RATE: 18 BRPM | HEART RATE: 89 BPM | HEIGHT: 67 IN | BODY MASS INDEX: 19.62 KG/M2 | OXYGEN SATURATION: 100 % | TEMPERATURE: 97.3 F | SYSTOLIC BLOOD PRESSURE: 98 MMHG

## 2021-02-18 LAB
EKG ATRIAL RATE: 66 BPM
EKG ATRIAL RATE: 73 BPM
EKG P AXIS: 36 DEGREES
EKG P AXIS: 51 DEGREES
EKG P-R INTERVAL: 102 MS
EKG P-R INTERVAL: 110 MS
EKG Q-T INTERVAL: 422 MS
EKG Q-T INTERVAL: 482 MS
EKG QRS DURATION: 90 MS
EKG QRS DURATION: 94 MS
EKG QTC CALCULATION (BAZETT): 464 MS
EKG QTC CALCULATION (BAZETT): 505 MS
EKG R AXIS: 70 DEGREES
EKG R AXIS: 73 DEGREES
EKG T AXIS: 32 DEGREES
EKG T AXIS: 57 DEGREES
EKG VENTRICULAR RATE: 66 BPM
EKG VENTRICULAR RATE: 73 BPM

## 2021-02-18 PROCEDURE — 93010 ELECTROCARDIOGRAM REPORT: CPT | Performed by: INTERNAL MEDICINE

## 2021-02-18 PROCEDURE — 93005 ELECTROCARDIOGRAM TRACING: CPT | Performed by: EMERGENCY MEDICINE

## 2021-02-18 RX ORDER — CEPHALEXIN 500 MG/1
500 CAPSULE ORAL 4 TIMES DAILY
Qty: 20 CAPSULE | Refills: 0 | Status: SHIPPED | OUTPATIENT
Start: 2021-02-18 | End: 2021-02-23

## 2021-02-18 NOTE — ED NOTES
Bed: 014A  Expected date: 2/17/21  Expected time: 8:05 PM  Means of arrival: [facility] Dept  Comments:     Citlali New RN  02/17/21 2010

## 2021-02-18 NOTE — ED NOTES
ED nurse-to-nurse bedside report    Chief Complaint   Patient presents with    Chest Pain    Other     Possible TB      LOC: alert and orientated to name, place, date  Vital signs   Vitals:    02/17/21 2019 02/17/21 2115 02/17/21 2202 02/17/21 2249   BP: 107/79 102/82 98/60 93/62   Pulse: 64 113 90 89   Resp: 17 16 16 16   Temp: 97.3 °F (36.3 °C)      TempSrc: Oral      SpO2: 100% 100% 100% 100%   Weight: 125 lb (56.7 kg)      Height: 5' 7\" (1.702 m)         Pain:    Pain Interventions: repositioning   Pain Goal: 5  Oxygen: No    Current needs required none   Telemetry: Yes  LDAs:   Peripheral IV 02/17/21 Right Antecubital (Active)   Site Assessment Clean;Dry; Intact 02/17/21 2115   Line Status Flushed 02/17/21 2115   Dressing Status Clean;Dry; Intact 02/17/21 2115   Dressing Intervention New 02/17/21 2031     Continuous Infusions:   Mobility: Independent  Silveira Fall Risk Score: No flowsheet data found.   Fall Interventions: low risk  Report given to: [id number] [facility],[address], RN  02/17/21 2038

## 2021-02-18 NOTE — ED TRIAGE NOTES
Pt to ED today via EMS with c/o SOB, chest pain, and possible exposure. Pt states her roommate was recently exposed and tested positive for TB. Pt states onset was a couple days ago. Pt states nothing makes pain worse, but pain was somewhat relieved with ASA administered by EMS.

## 2021-02-18 NOTE — ED NOTES
Pt assisted to the restroom. Pt returned to cot in stable condition. VSS.  Call light in reach     Keyanna Meyers RN  02/17/21 3922

## 2021-02-18 NOTE — ED PROVIDER NOTES
[place] ENCOUNTER          Pt Name: Bethanie Dozier  MRN: [id number]  [place] 1990  Date of evaluation: 2/17/2021  Emergency Physician: Marlo Solis DO    CHIEF COMPLAINT       Chief Complaint   Patient presents with    Chest Pain    Other     Possible TB     History obtained from the patient. HISTORY OF PRESENT ILLNESS    HPI  Bethanie Dozier is a 26 y.o. female who presents to the emergency department for evaluation of chest pain. Patient states she was was preparing chili for tomorrow. She states then she started having chest pain and feeling anxious. She states that pain is all over and described as dull and achy. She rated at 10 out of 10. No radiation. She denies fever denies chills. Denies cough or congestion. She states she has felt anxious since the person she is living with was recently told that he may have TB. She states she is scheduled to follow-up with health department with him tomorrow. History of polysubstance abuse. She states she smokes marijuana on a daily basis. The patient has no other acute complaints at this time. REVIEW OF SYSTEMS   Review of Systems   Constitutional: Negative for chills and fever. HENT: Negative for congestion, rhinorrhea and sore throat. Eyes: Negative for redness and visual disturbance. Respiratory: Positive for chest tightness. Negative for cough, shortness of breath and wheezing. Cardiovascular: Negative for chest pain and leg swelling. Gastrointestinal: Negative for abdominal pain, constipation, diarrhea, nausea and vomiting. Endocrine: Negative for polydipsia and polyuria. Genitourinary: Negative for decreased urine volume, dysuria and urgency. Musculoskeletal: Negative for back pain and myalgias. Skin: Negative for rash and wound. Neurological: Negative for light-headedness and headaches. Hematological: Does not bruise/bleed easily. Psychiatric/Behavioral: Negative for decreased concentration and dysphoric mood. PAST MEDICAL AND SURGICAL HISTORY     Past Medical History:   Diagnosis Date    Anxiety     History of migraine headaches      History reviewed. No pertinent surgical history. MEDICATIONS     Current Facility-Administered Medications:     magnesium sulfate 2000 mg in 50 mL IVPB premix, 2,000 mg, Intravenous, Once, Leandra Gamez DO, Last Rate: 25 mL/hr at 02/17/21 2206, 2,000 mg at 02/17/21 2206    Current Outpatient Medications:     ibuprofen (IBU) 600 MG tablet, Take 1 tablet by mouth every 6 hours as needed for Pain, Disp: 40 tablet, Rfl: 0      SOCIAL HISTORY     Social History     Social History Narrative    Daughter Alfreida Mohs will be 5 at the end of the month; she's in . Social History     Tobacco Use    Smoking status: Current Every Day Smoker     Packs/day: 0.50     Years: 10.00     Pack years: 5.00     Types: Cigarettes    Smokeless tobacco: Never Used   Substance Use Topics    Alcohol use: No    Drug use: No     Types: Marijuana, Methamphetamines         ALLERGIES   No Known Allergies      FAMILY HISTORY     Family History   Problem Relation Age of Onset    No Known Problems Mother     No Known Problems Father     Depression Sister          PHYSICAL EXAM     ED Triage Vitals [02/17/21 2019]   BP Temp Temp Source Pulse Resp SpO2 Height Weight   107/79 97.3 °F (36.3 °C) Oral 64 17 100 % 5' 7\" (1.702 m) 125 lb (56.7 kg)         Additional Vital Signs:  Vitals:    02/17/21 2202   BP: 98/60   Pulse: 90   Resp: 16   Temp:    SpO2: 100%       Physical Exam  Constitutional:       General: She is not in acute distress. Appearance: She is well-developed. She is not diaphoretic. HENT:      Head: Normocephalic and atraumatic. Eyes:      General:         Right eye: No discharge. Left eye: No discharge.       Conjunctiva/sclera: Conjunctivae normal.      Pupils: Pupils are equal, round, and reactive to light. Neck:      Musculoskeletal: Normal range of motion and neck supple. Thyroid: No thyromegaly. Vascular: No JVD. Cardiovascular:      Rate and Rhythm: Normal rate and regular rhythm. Pulses: Normal pulses. Heart sounds: Normal heart sounds. No murmur. Pulmonary:      Effort: Pulmonary effort is normal. No respiratory distress. Breath sounds: Normal breath sounds. Abdominal:      General: Bowel sounds are normal. There is no distension. Palpations: Abdomen is soft. Tenderness: There is no abdominal tenderness. Musculoskeletal: Normal range of motion. General: No tenderness. Lymphadenopathy:      Cervical: No cervical adenopathy. Skin:     General: Skin is warm and dry. Capillary Refill: Capillary refill takes less than 2 seconds. Findings: No rash. Neurological:      General: No focal deficit present. Mental Status: She is alert and oriented to person, place, and time. She is not disoriented. GCS: GCS eye subscore is 4. GCS verbal subscore is 5. GCS motor subscore is 6. Motor: No abnormal muscle tone or seizure activity. Gait: Gait normal.      Comments: Awake and alert. Moves all extremities. Non-focal exam with steady gait. Initial vital signs and nursing assessment reviewed and normal.   Pulsoximetry is normal per my interpretation. MEDICAL DECISION MAKING   Initial Assessment: Given the patient's above chief complaint and findings on history and physical examination, I thought it was appropriate to consider the following emergency medical conditions: Pneumonia, COVID-19, ACS, pulmonary embolus, illicit drug use, bronchitis, TB although some of these diagnoses are unlikely they were considered in my medical decision making.     Plan: CBC, BMP, CXR, Troponin, dimer UDS, ECG, Symptomatic treatment with Magnesium for prolonged QTc and reassess         ED RESULTS   Laboratory results:  Labs Reviewed BASIC METABOLIC PANEL W/ REFLEX TO MG FOR LOW K - Abnormal; Notable for the following components:       Result Value    Glucose 141 (*)     All other components within normal limits   CBC WITH AUTO DIFFERENTIAL - Abnormal; Notable for the following components:    RBC 3.82 (*)     Hemoglobin 11.7 (*)     Hematocrit 36.2 (*)     All other components within normal limits   URINE WITH REFLEXED MICRO - Abnormal; Notable for the following components:    Blood, Urine TRACE (*)     Leukocyte Esterase, Urine SMALL (*)     All other components within normal limits   COVID-19, RAPID   RAPID INFLUENZA A/B ANTIGENS   CULTURE, REFLEXED, URINE    Narrative:     Source: urine, clean catch       Site:           Current Antibiotics: not stated   BRAIN NATRIURETIC PEPTIDE   TROPONIN   PREGNANCY, URINE   ANION GAP   GLOMERULAR FILTRATION RATE, ESTIMATED   OSMOLALITY   D-DIMER, QUANTITATIVE   URINE DRUG SCREEN   MAGNESIUM       Radiologic studies results:  XR CHEST PORTABLE   Final Result   Impression:   1. No acute infiltrate. This document has been electronically signed by: Dulce Maria Reddy MD on    02/17/2021 09:26 PM          ED Medications administered this visit:   Medications   magnesium sulfate 2000 mg in 50 mL IVPB premix (2,000 mg Intravenous New Bag 2/17/21 2206)         ED COURSE      Patient ECG noted to have a prolonged QT and shortened OH interval.  Patient's urine drug screen tested positive for cocaine. This is likely causing her ECG changes. She was given a 2 g mag rider. Her ECG will need to be rechecked post neck exam.  Troponin was negative pregnancy test was negative chest x-ray was clear. She will need to follow-up with health department as scheduled tomorrow for further testing for her recent TB exposure. The diagnosis, extensive differential diagnosis, laboratory and imaging findings were discussed at the bedside. The patient was an active participant in their care.  They are agreeable to the plan of care. All questions and concerns were addressed at the time of the encounter. MEDICATION CHANGES     DISCHARGE MEDICATIONS:  Discharge Medication List as of 2/18/2021 12:26 AM      START taking these medications    Details   cephALEXin (KEFLEX) 500 MG capsule Take 1 capsule by mouth 4 times daily for 5 days, Disp-20 capsule, R-0Normal                  FINAL DISPOSITION     Final diagnoses:   Cocaine abuse (HCC)   Chest pain, unspecified type   Prolonged Q-T interval on ECG   Pyuria     Condition: condition: good  Dispo: Other Disposition: Pending at end of shift    PATIENT REFERRED TO:  Your family doctor    In 3 days  ED discharge follow up      Critical Care Time   None      This transcription was electronically signed. Parts of this transcriptions may have been dictated by use of voice recognition software and electronically transcribed, and parts may have been transcribed with the assistance of an ED scribe. The transcription may contain errors not detected in proofreading.     Electronically Signed: Savita Segovia, 02/17/21, 10:33 PM      Savita Segovia DO  02/18/21 4964

## 2021-02-18 NOTE — ED PROVIDER NOTES
Signed out to me at shift change. This patient has been seen and evaluated by previous shift physician, Dr. Jeramy Coon. Please refer to his/her note for detailed history, exam and MDM. Signed out time 11:17 PM    I was signed out to follow up EKG (prolonged QT with QTc 505 ms). I have personally seen and evaluated this patient at time of sign out. Briefly this is a 26 y.o. female present to ED c/o Chest Pain and Other (Possible TB)    ED work-ups were reassuring. Troponin is normal.  First EKG showed prolonged QT with QTc 505 ms. No acute ischemic changes. Patient received IV magnesium in ED. Repeated EKG showed normal sinus rhythm, short VT, QTc 464 ms. Normal EKG. Chest pain resolved on reassessment. Laboratory results were reviewed. UA shows pyuria. Urine culture is pending. Discharged as planned in stable conditions. PCP follow-up in 3-5 days. She is prescribed 5 days of Keflex.     VITALS  Vitals:    02/17/21 2202 02/17/21 2249 02/17/21 2321 02/18/21 0001   BP: 98/60 93/62 89/69 98/66   Pulse: 90 89 71 89   Resp: 16 16 18 18   Temp:       TempSrc:       SpO2: 100% 100% 100% 100%   Weight:       Height:             LABS  Results for orders placed or performed during the hospital encounter of 02/17/21   SARS-CoV-2 NAAT (Rapid)    Specimen: Nasopharyngeal Swab   Result Value Ref Range    SARS-CoV-2, NAAT NOT DETECTED NOT DETECTED   Rapid influenza A/B antigens    Specimen: Nasopharyngeal   Result Value Ref Range    Flu A Antigen Negative NEGATIVE    Flu B Antigen Negative NEGATIVE   Basic Metabolic Panel w/ Reflex to MG   Result Value Ref Range    Sodium 138 135 - 145 meq/L    Potassium reflex Magnesium 4.4 3.5 - 5.2 meq/L    Chloride 104 98 - 111 meq/L    CO2 26 23 - 33 meq/L    Glucose 141 (H) 70 - 108 mg/dL    BUN 14 7 - 22 mg/dL    CREATININE 0.7 0.4 - 1.2 mg/dL    Calcium 9.1 8.5 - 10.5 mg/dL   Brain Natriuretic Peptide   Result Value Ref Range    Pro-BNP 38.8 0.0 - 450.0 pg/mL   CBC Auto Differential   Result Value Ref Range    WBC 7.6 4.8 - 10.8 thou/mm3    RBC 3.82 (L) 4.20 - 5.40 mill/mm3    Hemoglobin 11.7 (L) 12.0 - 16.0 gm/dl    Hematocrit 36.2 (L) 37.0 - 47.0 %    MCV 94.8 81.0 - 99.0 fL    MCH 30.6 26.0 - 33.0 pg    MCHC 32.3 32.2 - 35.5 gm/dl    RDW-CV 12.3 11.5 - 14.5 %    RDW-SD 42.7 35.0 - 45.0 fL    Platelets 895 290 - 541 thou/mm3    MPV 9.8 9.4 - 12.4 fL    Seg Neutrophils 46.2 %    Lymphocytes 40.2 %    Monocytes 9.4 %    Eosinophils 2.9 %    Basophils 1.2 %    Immature Granulocytes 0.1 %    Segs Absolute 3.5 1.8 - 7.7 thou/mm3    Lymphocytes Absolute 3.1 1.0 - 4.8 thou/mm3    Monocytes Absolute 0.7 0.4 - 1.3 thou/mm3    Eosinophils Absolute 0.2 0.0 - 0.4 thou/mm3    Basophils Absolute 0.1 0.0 - 0.1 thou/mm3    Immature Grans (Abs) 0.01 0.00 - 0.07 thou/mm3    nRBC 0 /100 wbc   Troponin   Result Value Ref Range    Troponin T < 0.010 ng/ml   Pregnancy, Urine   Result Value Ref Range    Pregnancy, Urine NEGATIVE NEGATIVE   Anion Gap   Result Value Ref Range    Anion Gap 8.0 8.0 - 16.0 meq/L   Glomerular Filtration Rate, Estimated   Result Value Ref Range    Est, Glom Filt Rate >90 ml/min/1.73m2   Osmolality   Result Value Ref Range    Osmolality Calc 278.5 275.0 - 300.0 mOsmol/kg   D-Dimer, Quantitative   Result Value Ref Range    D-Dimer, Quant 411.00 0.00 - 500.00 ng/ml FEU   Urine Drug Screen   Result Value Ref Range    AMPHETAMINE+METHAMPHETAMINE URINE SCREEN Negative NEGATIVE    Barbiturate Quant, Ur Negative NEGATIVE    Benzodiazepine Quant, Ur Negative NEGATIVE    Cannabinoid Quant, Ur POSITIVE NEGATIVE    Cocaine Metab Quant, Ur POSITIVE NEGATIVE    Opiates, Urine Negative NEGATIVE    Oxycodone Negative NEGATIVE    PCP Quant, Ur Negative NEGATIVE   Magnesium   Result Value Ref Range    Magnesium 2.0 1.6 - 2.4 mg/dL   Urine with Reflexed Micro   Result Value Ref Range    Glucose, Ur NEGATIVE NEGATIVE mg/dl    Bilirubin Urine NEGATIVE NEGATIVE    Ketones, Urine NEGATIVE NEGATIVE    Specific Gravity, Urine 1.017 1.002 - 1.030    Blood, Urine TRACE (A) NEGATIVE    pH, UA 5.0 5.0 - 9.0    Protein, UA NEGATIVE NEGATIVE    Urobilinogen, Urine 0.2 0.0 - 1.0 eu/dl    Nitrite, Urine NEGATIVE NEGATIVE    Leukocyte Esterase, Urine SMALL (A) NEGATIVE    Color, UA YELLOW STRAW-YELLOW    Character, Urine CLEAR CLEAR-SL CLOUD    RBC, UA 0-2 0-2/hpf /hpf    WBC, UA 10-15 0-4/hpf /hpf    Epithelial Cells, UA 3-5 3-5/hpf /hpf    Bacteria, UA NONE SEEN FEW/NONE SEEN /hpf    Casts UA NONE SEEN NONE SEEN /lpf    Crystals, UA NONE SEEN NONE SEEN    Renal Epithelial, UA NONE SEEN NONE SEEN    Yeast, UA NONE SEEN NONE SEEN    CASTS 2 NONE SEEN NONE SEEN /lpf    MISCELLANEOUS 2 NONE SEEN    EKG 12 Lead   Result Value Ref Range    Ventricular Rate 66 BPM    Atrial Rate 66 BPM    P-R Interval 102 ms    QRS Duration 94 ms    Q-T Interval 482 ms    QTc Calculation (Bazett) 505 ms    P Axis 51 degrees    R Axis 70 degrees    T Axis 32 degrees         IMAGING STUDIES  XR CHEST PORTABLE   Final Result   Impression:   1. No acute infiltrate. This document has been electronically signed by: Von Telles MD on    02/17/2021 09:26 PM            ED MEDICATIONS  Medications   magnesium sulfate 2000 mg in 50 mL IVPB premix (0 mg Intravenous Stopped 2/18/21 0017)         DIAGNOSIS  1. Cocaine abuse (HCC)    2. Chest pain, unspecified type    3.  Prolonged Q-T interval on ECG    4. Pyuria          DISPOSITION and PLAN  home    Mary Grier M.D.       Mary Grier MD  02/18/21 1665

## 2021-02-19 LAB
ORGANISM: ABNORMAL
URINE CULTURE REFLEX: ABNORMAL